# Patient Record
Sex: MALE | Race: WHITE
[De-identification: names, ages, dates, MRNs, and addresses within clinical notes are randomized per-mention and may not be internally consistent; named-entity substitution may affect disease eponyms.]

---

## 2017-09-09 ENCOUNTER — HOSPITAL ENCOUNTER (EMERGENCY)
Dept: HOSPITAL 1 - ED | Age: 22
Discharge: HOME | End: 2017-09-09
Payer: SELF-PAY

## 2017-09-09 VITALS — BODY MASS INDEX: 27.66 KG/M2 | WEIGHT: 162 LBS | HEIGHT: 64 IN

## 2017-09-09 VITALS — DIASTOLIC BLOOD PRESSURE: 45 MMHG | SYSTOLIC BLOOD PRESSURE: 113 MMHG

## 2017-09-09 DIAGNOSIS — T63.511A: Primary | ICD-10-CM
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
## 2017-10-30 ENCOUNTER — HOSPITAL ENCOUNTER (EMERGENCY)
Dept: HOSPITAL 1 - ED | Age: 22
Discharge: HOME | End: 2017-10-30
Payer: SELF-PAY

## 2017-10-30 VITALS — BODY MASS INDEX: 26.63 KG/M2 | HEIGHT: 64 IN | WEIGHT: 156 LBS

## 2017-10-30 VITALS — DIASTOLIC BLOOD PRESSURE: 88 MMHG | SYSTOLIC BLOOD PRESSURE: 144 MMHG

## 2017-10-30 DIAGNOSIS — M54.5: ICD-10-CM

## 2017-10-30 DIAGNOSIS — K70.9: ICD-10-CM

## 2017-10-30 DIAGNOSIS — R03.0: ICD-10-CM

## 2017-10-30 DIAGNOSIS — E86.0: ICD-10-CM

## 2017-10-30 DIAGNOSIS — Z71.6: ICD-10-CM

## 2017-10-30 DIAGNOSIS — F17.210: ICD-10-CM

## 2017-10-30 DIAGNOSIS — G44.209: Primary | ICD-10-CM

## 2017-10-30 LAB
ALBUMIN SERPL-MCNC: 5.6 G/DL (ref 3.4–5)
ALP SERPL-CCNC: 69 U/L (ref 46–116)
ALT SERPL-CCNC: 359 U/L (ref 16–63)
AST SERPL-CCNC: 170 U/L (ref 15–37)
BASOPHILS NFR BLD: 0.3 % (ref 0–2)
BILIRUB SERPL-MCNC: 0.94 MG/DL (ref 0.2–1)
BUN SERPL-MCNC: 12 MG/DL (ref 7–18)
CALCIUM SERPL-MCNC: 10.1 MG/DL (ref 8.5–10.1)
CHLORIDE SERPL-SCNC: 100 MMOL/L (ref 98–107)
CO2 SERPL-SCNC: 24.8 MMOL/L (ref 21–32)
CREAT SERPL-MCNC: 1 MG/DL (ref 0.7–1.3)
ERYTHROCYTE [DISTWIDTH] IN BLOOD BY AUTOMATED COUNT: 13.5 % (ref 11.5–14.5)
GFR SERPLBLD BASED ON 1.73 SQ M-ARVRAT: > 60 ML/MIN
GLUCOSE SERPL-MCNC: 79 MG/DL (ref 74–106)
LIPASE SERPL-CCNC: 193 IU/L (ref 73–393)
MICROSCOPIC UR-IMP: YES
PLATELET # BLD: 249 X10^3MCL (ref 130–400)
POTASSIUM SERPL-SCNC: 3.9 MMOL/L (ref 3.5–5.1)
PROT SERPL-MCNC: 9.9 G/DL (ref 6.4–8.2)
RBC # UR STRIP.AUTO: (no result) /UL
SODIUM SERPL-SCNC: 138 MMOL/L (ref 136–145)
UA SPECIFIC GRAVITY: >=1.03 (ref 1–1.03)

## 2018-04-29 ENCOUNTER — HOSPITAL ENCOUNTER (EMERGENCY)
Dept: HOSPITAL 1 - ED | Age: 23
Discharge: HOME | End: 2018-04-29
Payer: COMMERCIAL

## 2018-04-29 VITALS — HEIGHT: 64.02 IN | BODY MASS INDEX: 25.61 KG/M2 | WEIGHT: 149.98 LBS

## 2018-04-29 VITALS — DIASTOLIC BLOOD PRESSURE: 88 MMHG | SYSTOLIC BLOOD PRESSURE: 162 MMHG

## 2018-04-29 DIAGNOSIS — M54.9: Primary | ICD-10-CM

## 2018-04-29 DIAGNOSIS — F10.129: ICD-10-CM

## 2018-04-29 LAB
ALBUMIN SERPL-MCNC: 4.8 G/DL (ref 3.4–5)
ALP SERPL-CCNC: 61 U/L (ref 46–116)
ALT SERPL-CCNC: 95 U/L (ref 16–63)
AST SERPL-CCNC: 54 U/L (ref 15–37)
BASOPHILS NFR BLD: 0.8 % (ref 0–2)
BILIRUB SERPL-MCNC: 0.3 MG/DL (ref 0.2–1)
BUN SERPL-MCNC: 8 MG/DL (ref 7–18)
CALCIUM SERPL-MCNC: 8.4 MG/DL (ref 8.5–10.1)
CHLORIDE SERPL-SCNC: 106 MMOL/L (ref 98–107)
CO2 SERPL-SCNC: 28.8 MMOL/L (ref 21–32)
CREAT SERPL-MCNC: 0.8 MG/DL (ref 0.7–1.3)
ERYTHROCYTE [DISTWIDTH] IN BLOOD BY AUTOMATED COUNT: 13.3 % (ref 11.5–14.5)
GFR SERPLBLD BASED ON 1.73 SQ M-ARVRAT: > 60 ML/MIN
GLUCOSE SERPL-MCNC: 83 MG/DL (ref 74–106)
PLATELET # BLD: 336 X10^3MCL (ref 130–400)
POTASSIUM SERPL-SCNC: 3.6 MMOL/L (ref 3.5–5.1)
PROT SERPL-MCNC: 8.9 G/DL (ref 6.4–8.2)
SODIUM SERPL-SCNC: 145 MMOL/L (ref 136–145)

## 2018-06-14 ENCOUNTER — HOSPITAL ENCOUNTER (EMERGENCY)
Dept: HOSPITAL 1 - ED | Age: 23
Discharge: HOME | End: 2018-06-14
Payer: COMMERCIAL

## 2018-06-14 VITALS — DIASTOLIC BLOOD PRESSURE: 87 MMHG | SYSTOLIC BLOOD PRESSURE: 136 MMHG

## 2018-06-14 VITALS — BODY MASS INDEX: 24.99 KG/M2 | HEIGHT: 65 IN | WEIGHT: 149.98 LBS

## 2018-06-14 DIAGNOSIS — M94.0: Primary | ICD-10-CM

## 2018-06-14 DIAGNOSIS — F17.200: ICD-10-CM

## 2018-07-09 ENCOUNTER — HOSPITAL ENCOUNTER (EMERGENCY)
Dept: HOSPITAL 1 - ED | Age: 23
Discharge: HOME | End: 2018-07-09
Payer: COMMERCIAL

## 2018-07-09 VITALS — HEIGHT: 62.99 IN | WEIGHT: 146.98 LBS | BODY MASS INDEX: 26.04 KG/M2

## 2018-07-09 VITALS — SYSTOLIC BLOOD PRESSURE: 131 MMHG | DIASTOLIC BLOOD PRESSURE: 71 MMHG
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
## 2018-07-09 DIAGNOSIS — S63.612A: Primary | ICD-10-CM

## 2018-07-09 DIAGNOSIS — Z87.19: ICD-10-CM
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
## 2018-07-09 DIAGNOSIS — R11.10: ICD-10-CM
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
## 2018-07-09 DIAGNOSIS — S09.90XA: ICD-10-CM

## 2018-09-23 ENCOUNTER — HOSPITAL ENCOUNTER (EMERGENCY)
Dept: HOSPITAL 1 - ED | Age: 23
Discharge: HOME | End: 2018-09-23
Payer: COMMERCIAL

## 2018-09-23 VITALS — WEIGHT: 165 LBS | HEIGHT: 64 IN | BODY MASS INDEX: 28.17 KG/M2

## 2018-09-23 VITALS — DIASTOLIC BLOOD PRESSURE: 109 MMHG | SYSTOLIC BLOOD PRESSURE: 144 MMHG

## 2018-09-23 DIAGNOSIS — K64.8: Primary | ICD-10-CM

## 2019-01-02 ENCOUNTER — HOSPITAL ENCOUNTER (EMERGENCY)
Dept: HOSPITAL 1 - ED | Age: 24
Discharge: HOME | End: 2019-01-02
Payer: SELF-PAY

## 2019-01-02 VITALS
BODY MASS INDEX: 26.63 KG/M2 | HEIGHT: 64 IN | HEIGHT: 64 IN | WEIGHT: 156 LBS | WEIGHT: 156 LBS | BODY MASS INDEX: 26.63 KG/M2

## 2019-01-02 VITALS — SYSTOLIC BLOOD PRESSURE: 114 MMHG | DIASTOLIC BLOOD PRESSURE: 56 MMHG
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
## 2019-01-02 DIAGNOSIS — F17.210: ICD-10-CM
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
## 2019-01-02 DIAGNOSIS — F10.10: ICD-10-CM
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
## 2019-01-02 DIAGNOSIS — S09.8XXA: Primary | ICD-10-CM

## 2019-01-02 DIAGNOSIS — F10.239: ICD-10-CM

## 2019-08-10 ENCOUNTER — HOSPITAL ENCOUNTER (EMERGENCY)
Dept: HOSPITAL 1 - ED | Age: 24
LOS: 1 days | Discharge: HOME | End: 2019-08-11
Payer: COMMERCIAL

## 2019-08-10 VITALS
HEIGHT: 64 IN | WEIGHT: 160 LBS | WEIGHT: 160 LBS | BODY MASS INDEX: 27.31 KG/M2 | HEIGHT: 64 IN | BODY MASS INDEX: 27.31 KG/M2
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
## 2019-08-10 DIAGNOSIS — K29.21: ICD-10-CM

## 2019-08-10 DIAGNOSIS — F10.129: Primary | ICD-10-CM

## 2019-08-11 VITALS — DIASTOLIC BLOOD PRESSURE: 41 MMHG | SYSTOLIC BLOOD PRESSURE: 100 MMHG

## 2019-08-11 LAB
ALBUMIN SERPL-MCNC: 4.2 G/DL (ref 3.4–5)
ALP SERPL-CCNC: 71 U/L (ref 46–116)
ALT SERPL-CCNC: 69 U/L (ref 16–63)
AST SERPL-CCNC: 35 U/L (ref 15–37)
BASOPHILS NFR BLD: 0.5 % (ref 0–2)
BILIRUB SERPL-MCNC: 0.15 MG/DL (ref 0.2–1)
BUN SERPL-MCNC: 7 MG/DL (ref 7–18)
CALCIUM SERPL-MCNC: 8.4 MG/DL (ref 8.5–10.1)
CHLORIDE SERPL-SCNC: 105 MMOL/L (ref 98–107)
CO2 SERPL-SCNC: 23.5 MMOL/L (ref 21–32)
CREAT SERPL-MCNC: 0.8 MG/DL (ref 0.7–1.3)
ERYTHROCYTE [DISTWIDTH] IN BLOOD BY AUTOMATED COUNT: 11.9 % (ref 11.5–14.5)
GFR SERPLBLD BASED ON 1.73 SQ M-ARVRAT: > 60 ML/MIN
GLUCOSE SERPL-MCNC: 108 MG/DL (ref 74–106)
PLATELET # BLD: 304 X10^3MCL (ref 130–400)
POTASSIUM SERPL-SCNC: 4.5 MMOL/L (ref 3.5–5.1)
PROT SERPL-MCNC: 8.2 G/DL (ref 6.4–8.2)
SODIUM SERPL-SCNC: 141 MMOL/L (ref 136–145)

## 2019-10-16 ENCOUNTER — HOSPITAL ENCOUNTER (INPATIENT)
Dept: HOSPITAL 1 - ED | Age: 24
LOS: 3 days | Discharge: HOME | DRG: 241 | End: 2019-10-19
Attending: HOSPITALIST | Admitting: HOSPITALIST
Payer: COMMERCIAL

## 2019-10-16 VITALS
WEIGHT: 145 LBS | WEIGHT: 145 LBS | BODY MASS INDEX: 25.69 KG/M2 | HEIGHT: 63 IN | BODY MASS INDEX: 25.69 KG/M2 | HEIGHT: 63 IN

## 2019-10-16 VITALS — DIASTOLIC BLOOD PRESSURE: 80 MMHG | SYSTOLIC BLOOD PRESSURE: 126 MMHG

## 2019-10-16 DIAGNOSIS — E83.51: ICD-10-CM

## 2019-10-16 DIAGNOSIS — K29.71: Primary | ICD-10-CM

## 2019-10-16 DIAGNOSIS — R74.0: ICD-10-CM

## 2019-10-16 DIAGNOSIS — E72.20: ICD-10-CM

## 2019-10-16 DIAGNOSIS — E78.5: ICD-10-CM

## 2019-10-16 DIAGNOSIS — F17.210: ICD-10-CM

## 2019-10-16 DIAGNOSIS — F10.220: ICD-10-CM

## 2019-10-16 DIAGNOSIS — N39.0: ICD-10-CM

## 2019-10-16 DIAGNOSIS — K72.90: ICD-10-CM
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
## 2019-10-16 DIAGNOSIS — G92: ICD-10-CM

## 2019-10-16 DIAGNOSIS — K29.80: ICD-10-CM

## 2019-10-16 DIAGNOSIS — E83.41: ICD-10-CM

## 2019-10-16 LAB
ALBUMIN SERPL-MCNC: 4.3 G/DL (ref 3.4–5)
ALP SERPL-CCNC: 79 U/L (ref 46–116)
ALT SERPL-CCNC: 81 U/L (ref 16–63)
AMPHETAMINES UR QL SCN: (no result)
AST SERPL-CCNC: 80 U/L (ref 15–37)
BASOPHILS NFR BLD: 0.9 % (ref 0–2)
BILIRUB SERPL-MCNC: 0.2 MG/DL (ref 0.2–1)
BUN SERPL-MCNC: 9 MG/DL (ref 7–18)
CALCIUM SERPL-MCNC: 8.1 MG/DL (ref 8.5–10.1)
CHLORIDE SERPL-SCNC: 103 MMOL/L (ref 98–107)
CHOLEST SERPL-MCNC: 249 MG/DL (ref ?–200)
CO2 SERPL-SCNC: 26 MMOL/L (ref 21–32)
CREAT SERPL-MCNC: 0.8 MG/DL (ref 0.7–1.3)
ERYTHROCYTE [DISTWIDTH] IN BLOOD BY AUTOMATED COUNT: 13.5 % (ref 11.5–14.5)
GFR SERPLBLD BASED ON 1.73 SQ M-ARVRAT: > 60 ML/MIN
GLUCOSE SERPL-MCNC: 105 MG/DL (ref 74–106)
HDLC SERPL-MCNC: 50 MG/DL (ref 40–60)
LIPASE SERPL-CCNC: 270 IU/L (ref 73–393)
MAGNESIUM SERPL-MCNC: 2.5 MG/DL (ref 1.8–2.4)
MICROSCOPIC UR-IMP: YES
PLATELET # BLD: 323 X10^3MCL (ref 130–400)
POTASSIUM SERPL-SCNC: 4 MMOL/L (ref 3.5–5.1)
PROT SERPL-MCNC: 8.5 G/DL (ref 6.4–8.2)
RBC # UR STRIP.AUTO: (no result) /UL
SODIUM SERPL-SCNC: 142 MMOL/L (ref 136–145)
T4 SERPL-MCNC: 6.9 UG/DL (ref 4.7–13.3)
UA SPECIFIC GRAVITY: <=1.005 (ref 1–1.03)

## 2019-10-16 PROCEDURE — G0480 DRUG TEST DEF 1-7 CLASSES: HCPCS

## 2019-10-16 PROCEDURE — C9113 INJ PANTOPRAZOLE SODIUM, VIA: HCPCS

## 2019-10-16 PROCEDURE — G0378 HOSPITAL OBSERVATION PER HR: HCPCS

## 2019-10-16 NOTE — NUR
RECEIVED PT FROM ER, PT ADMIT FOR HEPATIC ENCEPHALOPATHY, ABD PAIN, PT IS A/O
X4, VERBAL RESPONSIVE, LUNG SOUND CLEAR BILATERAL, NO COUGH, NO SOB, PT IS ON
TELE 10, ST, DENY ANY CHEST PAIN OR DISCOMFORT, BOWEL SOUND PRESENT ALL 4
QUADRANTS, NO DISTENTION, C/O ABD PAIN AT MID RADATE TO BACK 10/10, C/O BLOODY
VOMITING AND DARK STOOL. PEDAL PULSE PRESENT BOTH FEET, NO EDEMA, IV AT RIGHT
AC, NO LEAKING, NO INFILRATION. ALL ADLS ASSIST, ALL NEED MET, CALL LIGHT
IN REACH, WILL CONTINUE TO MONITOR.

## 2019-10-16 NOTE — NUR
RECEIVED PATIENT FROM TRIAGE, PATIENT AMBULATED TO OB ROOM, C/O MID ABD PAIN X
2.5 WEEKS (ON AND OFF), "VOMITING BLOOD" X 4+WEEKS, STATES LAST ALCOHOL DRINK
WAS YESTERDAY. PATIENT IS AAO X 4 (NAME, DATE, CONDITION AND LOCATION). EYES -
SOBEIDA. MUCUS - PINK. LUNGS - CTA. BS - PRESENT X 4 QUADRANTS. B/L UPPER AND
LOWER EXT PULSES PALPABLE. WILL CONTINUE TO MONITOR//APR RN

## 2019-10-16 NOTE — NUR
STRATED ON IVF NS AT 100CC/HR INFUSING VIA PERIPHERAL LINE AT THE [facility],
LEVAQUIN ATB IVPB IN PROGRESS. PLACED CALL LITH WITHIN REACH. BED LOCKED AND
IN LOWEST POSITION. WILL CONTINUE TO MONITOR.

## 2019-10-16 NOTE — NUR
PATIENT SLEEPING, BUT EASILY AROUSABLE AT THIS TIME. AS PER DR HICKS ONLY 1
BANANA BAG NEEDED, HE'LL D/C THE 2ND BANANA BAG THAT HE ORDERED.//APR RN

## 2019-10-16 NOTE — NUR
PT. LAYING IN BED, AAOX4, NO ACUTE DISTRESS NOTED, STS HIS PAIN LEVEL 10/10,
MID ABDOMINAL REGION + NAUSEA, PT. STS. HE HAS HAD THIS PAIN FOR TOO MANY YEARS
NOW, DR. KEYS AWARE, VSS, WILL CONTINUE TO MONITOR.

## 2019-10-17 VITALS — SYSTOLIC BLOOD PRESSURE: 121 MMHG | DIASTOLIC BLOOD PRESSURE: 77 MMHG

## 2019-10-17 VITALS — SYSTOLIC BLOOD PRESSURE: 136 MMHG | DIASTOLIC BLOOD PRESSURE: 78 MMHG

## 2019-10-17 VITALS — DIASTOLIC BLOOD PRESSURE: 93 MMHG | SYSTOLIC BLOOD PRESSURE: 140 MMHG

## 2019-10-17 VITALS — SYSTOLIC BLOOD PRESSURE: 127 MMHG | DIASTOLIC BLOOD PRESSURE: 79 MMHG

## 2019-10-17 VITALS — DIASTOLIC BLOOD PRESSURE: 86 MMHG | SYSTOLIC BLOOD PRESSURE: 135 MMHG

## 2019-10-17 LAB
BASOPHILS NFR BLD: 0.4 % (ref 0–2)
BASOPHILS NFR BLD: 0.5 % (ref 0–2)
BUN SERPL-MCNC: 8 MG/DL (ref 7–18)
CALCIUM SERPL-MCNC: 7.6 MG/DL (ref 8.5–10.1)
CHLORIDE SERPL-SCNC: 104 MMOL/L (ref 98–107)
CO2 SERPL-SCNC: 22.8 MMOL/L (ref 21–32)
CREAT SERPL-MCNC: 0.8 MG/DL (ref 0.7–1.3)
ERYTHROCYTE [DISTWIDTH] IN BLOOD BY AUTOMATED COUNT: 13.2 % (ref 11.5–14.5)
ERYTHROCYTE [DISTWIDTH] IN BLOOD BY AUTOMATED COUNT: 13.4 % (ref 11.5–14.5)
GFR SERPLBLD BASED ON 1.73 SQ M-ARVRAT: > 60 ML/MIN
GLUCOSE SERPL-MCNC: 80 MG/DL (ref 74–106)
MAGNESIUM SERPL-MCNC: 2 MG/DL (ref 1.8–2.4)
PHOSPHATE SERPL-MCNC: 2.6 MG/DL (ref 2.5–4.9)
PLATELET # BLD: 230 X10^3MCL (ref 130–400)
PLATELET # BLD: 239 X10^3MCL (ref 130–400)
POTASSIUM SERPL-SCNC: 4 MMOL/L (ref 3.5–5.1)
SODIUM SERPL-SCNC: 139 MMOL/L (ref 136–145)

## 2019-10-17 NOTE — NUR
QUIE IN BED. DENIES ANY PAIN/DISCOMFORT. ALL NEEDS ATTENDED. NO ADVERSE
REACTION NOTED FROM ATB THERAPY.

## 2019-10-17 NOTE — NUR
RECEIVED AWAKE WATCHING TV. SKIN WARM AND DRY TO TOUCH. RESPIRATION EVEN AND
UNLABORED. DENIES ANY PAIN/DISCOMFORT AT THIS TIME. IV ACCESS AT THE LRFT HAND
INTACT AND PATENT. NO SWELLING /NO REDNESS NOTED AT THE IV SITE. CALL LIGHT
WITHIN REACH . INSTRUCTED TO CALL FOR ANY ASSISTANCE AS NEEDED ,

## 2019-10-17 NOTE — NUR
PT STABLE AT THIS TIME RESTIN G IN BED COMFORTABLY WITH MOTHER AT BEDSIDE. ALL
CARES TOLERATED WELL. VS WNL, NO DISTRESS NOTED. IV TO LEFT HAND INTACT AND
PATENT WITH NO REDNESS OR INFLAMMATION NOTED, NS RUNNING AT 125ML/HR. A/O X4
NO HA OR DIZZINESS. TELE #10 CONNECTED TO PT, DENIES CP OR PRESSURE. NO N/V AT
THIS TIME. SAFETY PRECAUTIONS IN PLACE, CALL LIGHT WITHIN REACH, WILL ENDORSE
TO NIGHT NURSE.

## 2019-10-17 NOTE — NUR
EYES CLOSED, APARENTLY ASLEEP. NO FACIAL GRIMACING NOTED. RESPIRATION EVEN AND
UNLABORED. NO S/S OF ACUTE DISTRESS.

## 2019-10-17 NOTE — NUR
RECIEVED PT RESING IN BED WITH NO C/O OF ANY DISTRESS. A/O X4 WITH NO HEA OR
DIZZINESS. DENIES ANY N/V AT THIS TIME. TELE #10 CONNECTED TO PT, DENIES CP OR
PRESSURE. NS 125ML/HR RUNNING IN RAC, INTACT AND PATENT WITH NO REDNESS OR
INFLAMMATION NOTED. SAFETY PRECUTIONS IN PLACE, CALL LIGHT WITHIN REACH, WILL
MONITOR.

## 2019-10-17 NOTE — NUR
OLD IV REMOVED FROM RAC WITH CATHETER INTACT, NEW IV STARTED IN LEFT HAND WITH
22 GUAGE, INTACT AND PATENT WITH NO REDNESS OR INFLAMMATION NOTED.

## 2019-10-17 NOTE — NUR
BLADDER SKAN DONE PER MD ORDER, 240 ML URINE FOUND IN BLADDER, NO NEED FOR
STRAIGHT CATH PER DR KHOURY.

## 2019-10-17 NOTE — NUR
ZOFRAN GIVEN PER EMAR FOR VOMITING. VOMIT HAS TRACE BLOOD IN IT, DR KHOURY NOTIFIED AND ALSO MADE AWARE OF AMMONIA LEVEL OF 52. WILL FOLLOW
THROUGH WITH ANY NEW ORDERS.

## 2019-10-18 VITALS — DIASTOLIC BLOOD PRESSURE: 62 MMHG | SYSTOLIC BLOOD PRESSURE: 109 MMHG

## 2019-10-18 VITALS — DIASTOLIC BLOOD PRESSURE: 72 MMHG | SYSTOLIC BLOOD PRESSURE: 121 MMHG

## 2019-10-18 VITALS — DIASTOLIC BLOOD PRESSURE: 89 MMHG | SYSTOLIC BLOOD PRESSURE: 129 MMHG

## 2019-10-18 VITALS — SYSTOLIC BLOOD PRESSURE: 140 MMHG | DIASTOLIC BLOOD PRESSURE: 96 MMHG

## 2019-10-18 VITALS — DIASTOLIC BLOOD PRESSURE: 71 MMHG | SYSTOLIC BLOOD PRESSURE: 119 MMHG

## 2019-10-18 LAB
BASOPHILS NFR BLD: 0.4 % (ref 0–2)
BUN SERPL-MCNC: 7 MG/DL (ref 7–18)
CALCIUM SERPL-MCNC: 8.8 MG/DL (ref 8.5–10.1)
CHLORIDE SERPL-SCNC: 100 MMOL/L (ref 98–107)
CO2 SERPL-SCNC: 28.5 MMOL/L (ref 21–32)
CREAT SERPL-MCNC: 0.8 MG/DL (ref 0.7–1.3)
ERYTHROCYTE [DISTWIDTH] IN BLOOD BY AUTOMATED COUNT: 13.1 % (ref 11.5–14.5)
GFR SERPLBLD BASED ON 1.73 SQ M-ARVRAT: > 60 ML/MIN
GLUCOSE SERPL-MCNC: 83 MG/DL (ref 74–106)
MAGNESIUM SERPL-MCNC: 2 MG/DL (ref 1.8–2.4)
PHOSPHATE SERPL-MCNC: 3 MG/DL (ref 2.5–4.9)
PLATELET # BLD: 232 X10^3MCL (ref 130–400)
POTASSIUM SERPL-SCNC: 4.5 MMOL/L (ref 3.5–5.1)
SODIUM SERPL-SCNC: 135 MMOL/L (ref 136–145)

## 2019-10-18 PROCEDURE — 0DB68ZX EXCISION OF STOMACH, VIA NATURAL OR ARTIFICIAL OPENING ENDOSCOPIC, DIAGNOSTIC: ICD-10-PCS | Performed by: INTERNAL MEDICINE

## 2019-10-18 NOTE — NUR
MAINTAINED ON NPO AFTER MIDNIGHT FOR EGD IN AM. LACTULOSE GIVEN AS ORDERED,
TOLERATED WELL. ABLE TO REPOSITION SELF IN BED.

## 2019-10-18 NOTE — NUR
RECEIVED PT BACK FROM GI, PT IS DROWSY BUT EASILY AROUSABLE TO VERBAL STIMULI.
NO C/O PAIN AND SOB. VITAL SIGNS ARE AS FOLLOWS: NH=766/72, RR=16, TEMP=97.1,
WY=69, AND O2 SAT=98%, RA. PT DENIES SWALLOWING DIFFICULTIES. W/ IV ON THE
LEFT HAND GAUGE 22, PATENT AND INTACT. CALL LIGHT ON REACH. HOB ELEVATED AT 45
DEG. WILL CONT TO MONITOR

## 2019-10-18 NOTE — NUR
RECEIVED PT IN BED, A/O X4. ABLE TO VERBALIZE NEEDS. DENIES
HEADACHE/DIZZINESS. RESP. EVEN AND UNLABORED. ON ROOM AIR, NO ACUTE
DISTRESS NOTED. AFEBRILE AND VITAL SIGNS STABLE. SR ON THE  MONITOR, DENIES CP
OR ANY DISCOMFORT AT THIS TIME. ABD. SOFT, NON DISTENDED , BS ACTIVE, NO N/V
NOTED. IVF, NS AT 125ML/HR, INTACT AND INFUSING VIA LT HAND, SITE CLEAR.
AMBULATORY. VOIDING FREELY. CALL LIGHT WITHIN REAACH. WILL CONTINUE TO
MONITOR.

## 2019-10-18 NOTE — NUR
RECEIVED PT FROM NIGHT SHIFT NURSE. PT RESTING IN BED, AOX4, RESP E/U ON RA.
C/O HEADACHE AND ABD PAIN RATED 8/10, DENIES N/V. WILL CARRY OUT PAIN MED
ORDERS PER EMAR, COMFORT MEASURES IMPLEMENTED. ON TELE 10 SHOWING NSR, HR: 63.
IV TO LFA W/ NO SIGNS OF INFILTRATION, IVF INFUSING WELL. BED IN LOWEST
POSITION AND CALL LIGHT WITHIN REACH. WILL CONTINUE TO MONITOR.

## 2019-10-18 NOTE — NUR
CHG BATH GIVEN TOELRATED WELL.CONTINUES WITH IVF NS AT 125ML/HR VIA PERIPHERAL
LINE AT THE [facility]. FOR EGD TODAY, MAINTAINED ON NPO, PT
COOPERATIVE WITH PLAN OF CARE. ALL NEEDS ATTENDED.

## 2019-10-18 NOTE — NUR
Initial Nutrition Assessment: 253T/B FATIMAH BLACKWOOD IA HR
 
 Dx: Hepatic encephalopathy, abd pain
 PMHx: none
 PSHx: none
Labs: NA 135L, AST 80H, ALT 81H, CHOL 249H, Ammonia 52H, WBC 3.7L
 Meds: Ativan, cephulac, Colace, folic acid, Levaquin, theragran, vitamin B-1,
zofran
Diet: NPO (procedure)
PO intake since admission: NPO
 Ht: 160.02 cm (63")               Wt: 65.7 kg (145#)       BMI: 25.7 kg/m2
  Bed scale: 65.7 kg
IBW: 124# (56 kg) %IBW: 116 UBW: 145#
 Age: 24/M
 Food Allergies: NKFA
 Skin: intact Stalin: 23
 Edema: none
 GI: dark stool w/ bloody emesis on 10/17   Last BM: 10/15
 Trigger: N/V/D >3d, poor PO >3d
Per H&P, Pt is a 24 years old male with no significant PMH who brought to ED
due to acute epigastric pain.
 
RD Note (10/18): Patient was alert and oriented and said that he had some
nausea, vomiting a couple of hours ago and is better now. Patient was NPO for
EGD and diet has been progressed to regular diet.
 
 
 Problem with:  N/V/D/C: none at this time
 Problems with: Chewing:  Swallowing:  none
 Current appetite: good
Recent wt change: none  %wt change: n/a
Vitamin/Supplement use: none at home
Special diet at home: Regular
Physical activity: walking
Nutrition education given: none at this time
 Food-drug interactions: none Education given: n/a
 
 Estimated Nutritional Needs Based on current body weight (65.7 kg)
 Energy:1589-5161 kcal/day (25-35 kcal/kg for hepatic encephalopathy)
 Protein: 53-65 g/day (0.8-1.0 g/kg for hepatic encephalopathy)
 Fluid: 2568-5158 mL/day (1 mL/kcal)
 
Nutrition Diagnosis:
1. Impaired nutrient utilization related to hepatic encephalopathy as
evidenced by ammonia 52.
Intervention
1. Recommend low fat, low sodium diet.
Paged NP Fabi, waiting for call back.
 
Monitor/Evaluate
 Goal: PO intake at least 75% of estimated needs
 Monitor: PO intake, Labs, GI function
 F/U in 3-5 days as moderate risk 10/21-23

## 2019-10-18 NOTE — NUR
PT RESTING IN BED, AOX4, RESP E/U ON RA. DENIES HEADACHE, ABD/BACK PAIN OR N/V
AT THIS TIME, NO ACUTE DISTRESS NOTED. BED IN LOWEST POSITION AND CALL LIGHT
WITHIN REACH. FAMILY AT BEDSIDE. WILL CONTINUE TO MONITOR.

## 2019-10-18 NOTE — NUR
PT MEDICATED AS ORDERED PER EMAR FOR HEADACHE/ABD PAIN RATED 9/10. IV TO LEFT
HAND PATENT AND INTACT. BED IN LOWEST POSITIONA AND CALL LIGHT WITHIN REACH.
FAMILY AT BEDSIDE. WILL ENDORSE TO ONCOMING NURSE.

## 2019-10-19 VITALS — DIASTOLIC BLOOD PRESSURE: 65 MMHG | SYSTOLIC BLOOD PRESSURE: 113 MMHG

## 2019-10-19 VITALS — DIASTOLIC BLOOD PRESSURE: 67 MMHG | SYSTOLIC BLOOD PRESSURE: 114 MMHG

## 2019-10-19 VITALS — DIASTOLIC BLOOD PRESSURE: 81 MMHG | SYSTOLIC BLOOD PRESSURE: 135 MMHG

## 2019-10-19 VITALS — DIASTOLIC BLOOD PRESSURE: 85 MMHG | SYSTOLIC BLOOD PRESSURE: 135 MMHG

## 2019-10-19 LAB
BASOPHILS NFR BLD: 0.3 % (ref 0–2)
BUN SERPL-MCNC: 10 MG/DL (ref 7–18)
CALCIUM SERPL-MCNC: 8.3 MG/DL (ref 8.5–10.1)
CHLORIDE SERPL-SCNC: 104 MMOL/L (ref 98–107)
CO2 SERPL-SCNC: 27.6 MMOL/L (ref 21–32)
CREAT SERPL-MCNC: 1 MG/DL (ref 0.7–1.3)
ERYTHROCYTE [DISTWIDTH] IN BLOOD BY AUTOMATED COUNT: 13.1 % (ref 11.5–14.5)
GFR SERPLBLD BASED ON 1.73 SQ M-ARVRAT: > 60 ML/MIN
GLUCOSE SERPL-MCNC: 94 MG/DL (ref 74–106)
PLATELET # BLD: 229 X10^3MCL (ref 130–400)
POTASSIUM SERPL-SCNC: 4 MMOL/L (ref 3.5–5.1)
SODIUM SERPL-SCNC: 139 MMOL/L (ref 136–145)

## 2019-10-19 NOTE — NUR
RESTING QUIETLY IN BED, WITH EYES CLOSED, APPEARS ASLEEP,  EASILY AROUSABLE.
RESP. EVEN AND UNLABORED. NO ACUTE DISTRESS NOTED.CALL LIGHT WITHIN REACH.
WILL CONTINUE TO MONITOR.

## 2019-10-19 NOTE — NUR
PT DC;D HOME IN NO DISTRESS, AWAKE, ALERT AND ORIENTED. VS STABLE. NO C/O PAIN
OR DISCOMFORT. NO ACTIVE GI BLEED. DC INSTRUCTIONS REVIEWED WITH PT AND
FAMILY. RX TO BE PICKED UP IN PHARMACY, PT AWARE. HL REMOVED AND SITE CLEAR.
PERSONAL BELONGINGS TAKEN HOME.

## 2019-10-19 NOTE — NUR
RECEIVED AWAKE, ALERT AND ORIENTED. NO RESP. DISTRESS NOTED. NO C/O PAIN OR
DISCOMFORT. VS WNL. IVF INFUSING WELL AND SITE CLEAR. CALL LIGHT WITHIN REACH.
WILL CONTINUE WITH PLAN OF CARE.

## 2019-10-19 NOTE — NUR
SLEPT WELL. NO COMPLAINTS NOTED. AFEBRILE AND VITAL SIGNS STABLE.DENIES PAIN
OR ANY DISCOMFORT. RESP. EVEN AND UNLABORED. NO ACUTE DISTRESS NOTED. DUE MEDS
GIVEN AS ORDERED, KRISS. WELL. IVF INTACT AND INFUSING WELL, SITE CLEAR. KEPT
COMFORTABLE. CALL LIGHT WITHIN REACH. WILL CONTINUE TO MONITOR.

## 2019-10-25 ENCOUNTER — HOSPITAL ENCOUNTER (EMERGENCY)
Dept: HOSPITAL 1 - ED | Age: 24
Discharge: HOME | End: 2019-10-25
Payer: COMMERCIAL

## 2019-10-25 VITALS — WEIGHT: 154.25 LBS | HEIGHT: 63 IN | BODY MASS INDEX: 27.33 KG/M2

## 2019-10-25 VITALS — SYSTOLIC BLOOD PRESSURE: 130 MMHG | DIASTOLIC BLOOD PRESSURE: 75 MMHG

## 2019-10-25 DIAGNOSIS — R10.13: Primary | ICD-10-CM

## 2019-10-25 LAB
ALBUMIN SERPL-MCNC: 4.2 G/DL (ref 3.4–5)
ALBUMIN SERPL-MCNC: 4.3 G/DL (ref 3.4–5)
ALP SERPL-CCNC: 58 U/L (ref 46–116)
ALP SERPL-CCNC: 60 U/L (ref 46–116)
ALT SERPL-CCNC: 105 U/L (ref 16–63)
ALT SERPL-CCNC: 108 U/L (ref 16–63)
AST SERPL-CCNC: 47 U/L (ref 15–37)
AST SERPL-CCNC: 49 U/L (ref 15–37)
BASOPHILS NFR BLD: 0.8 % (ref 0–2)
BILIRUB DIRECT SERPL-MCNC: 0.19 MG/DL (ref 0–0.2)
BILIRUB SERPL-MCNC: 0.8 MG/DL (ref 0.2–1)
BILIRUB SERPL-MCNC: 0.9 MG/DL (ref 0.2–1)
BUN SERPL-MCNC: 10 MG/DL (ref 7–18)
CALCIUM SERPL-MCNC: 8.9 MG/DL (ref 8.5–10.1)
CHLORIDE SERPL-SCNC: 103 MMOL/L (ref 98–107)
CO2 SERPL-SCNC: 29.8 MMOL/L (ref 21–32)
CREAT SERPL-MCNC: 0.9 MG/DL (ref 0.7–1.3)
ERYTHROCYTE [DISTWIDTH] IN BLOOD BY AUTOMATED COUNT: 13 % (ref 11.5–14.5)
GFR SERPLBLD BASED ON 1.73 SQ M-ARVRAT: > 60 ML/MIN
GLUCOSE SERPL-MCNC: 90 MG/DL (ref 74–106)
LIPASE SERPL-CCNC: 218 IU/L (ref 73–393)
MICROSCOPIC UR-IMP: YES
PLATELET # BLD: 217 X10^3MCL (ref 130–400)
POTASSIUM SERPL-SCNC: 4.3 MMOL/L (ref 3.5–5.1)
PROT SERPL-MCNC: 7.7 G/DL (ref 6.4–8.2)
PROT SERPL-MCNC: 8 G/DL (ref 6.4–8.2)
RBC # UR STRIP.AUTO: (no result) /UL
SODIUM SERPL-SCNC: 139 MMOL/L (ref 136–145)
UA SPECIFIC GRAVITY: 1.01 (ref 1–1.03)

## 2019-10-28 ENCOUNTER — HOSPITAL ENCOUNTER (EMERGENCY)
Dept: HOSPITAL 1 - ED | Age: 24
Discharge: HOME | End: 2019-10-28
Payer: COMMERCIAL

## 2019-10-28 VITALS
HEIGHT: 63 IN | WEIGHT: 155 LBS | BODY MASS INDEX: 27.46 KG/M2 | WEIGHT: 155 LBS | BODY MASS INDEX: 27.46 KG/M2 | HEIGHT: 63 IN

## 2019-10-28 VITALS — SYSTOLIC BLOOD PRESSURE: 120 MMHG | DIASTOLIC BLOOD PRESSURE: 60 MMHG

## 2019-10-28 DIAGNOSIS — F10.129: ICD-10-CM

## 2019-10-28 DIAGNOSIS — K29.70: Primary | ICD-10-CM

## 2019-10-28 LAB
ALBUMIN SERPL-MCNC: 4.4 G/DL (ref 3.4–5)
ALP SERPL-CCNC: 76 U/L (ref 46–116)
ALT SERPL-CCNC: 93 U/L (ref 16–63)
AST SERPL-CCNC: 50 U/L (ref 15–37)
BASOPHILS NFR BLD: 0.5 % (ref 0–2)
BILIRUB SERPL-MCNC: 0.2 MG/DL (ref 0.2–1)
BUN SERPL-MCNC: 7 MG/DL (ref 7–18)
CALCIUM SERPL-MCNC: 8.1 MG/DL (ref 8.5–10.1)
CHLORIDE SERPL-SCNC: 103 MMOL/L (ref 98–107)
CO2 SERPL-SCNC: 23.4 MMOL/L (ref 21–32)
CREAT SERPL-MCNC: 0.8 MG/DL (ref 0.7–1.3)
ERYTHROCYTE [DISTWIDTH] IN BLOOD BY AUTOMATED COUNT: 13.6 % (ref 11.5–14.5)
GFR SERPLBLD BASED ON 1.73 SQ M-ARVRAT: > 60 ML/MIN
GLUCOSE SERPL-MCNC: 104 MG/DL (ref 74–106)
LIPASE SERPL-CCNC: 329 IU/L (ref 73–393)
PLATELET # BLD: 265 X10^3MCL (ref 130–400)
POTASSIUM SERPL-SCNC: 3.7 MMOL/L (ref 3.5–5.1)
PROT SERPL-MCNC: 8 G/DL (ref 6.4–8.2)
SODIUM SERPL-SCNC: 138 MMOL/L (ref 136–145)

## 2019-10-28 PROCEDURE — G0480 DRUG TEST DEF 1-7 CLASSES: HCPCS

## 2019-11-09 ENCOUNTER — HOSPITAL ENCOUNTER (EMERGENCY)
Dept: HOSPITAL 1 - ED | Age: 24
Discharge: HOME | End: 2019-11-09
Payer: COMMERCIAL

## 2019-11-09 VITALS — DIASTOLIC BLOOD PRESSURE: 88 MMHG | SYSTOLIC BLOOD PRESSURE: 132 MMHG

## 2019-11-09 VITALS — HEIGHT: 62 IN | BODY MASS INDEX: 29.1 KG/M2 | WEIGHT: 158.13 LBS

## 2019-11-09 DIAGNOSIS — R07.81: Primary | ICD-10-CM

## 2020-02-10 ENCOUNTER — HOSPITAL ENCOUNTER (EMERGENCY)
Dept: HOSPITAL 1 - ED | Age: 25
Discharge: HOME | End: 2020-02-10
Payer: COMMERCIAL

## 2020-02-10 VITALS — SYSTOLIC BLOOD PRESSURE: 126 MMHG | DIASTOLIC BLOOD PRESSURE: 90 MMHG

## 2020-02-10 VITALS
WEIGHT: 152 LBS | HEIGHT: 62 IN | BODY MASS INDEX: 27.97 KG/M2 | WEIGHT: 152 LBS | BODY MASS INDEX: 27.97 KG/M2 | HEIGHT: 62 IN
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
## 2020-02-10 DIAGNOSIS — S93.402A: Primary | ICD-10-CM
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
## 2020-10-01 ENCOUNTER — HOSPITAL ENCOUNTER (EMERGENCY)
Dept: HOSPITAL 1 - ED | Age: 25
Discharge: HOME | End: 2020-10-01
Payer: COMMERCIAL

## 2020-10-01 VITALS — WEIGHT: 175 LBS | BODY MASS INDEX: 28.12 KG/M2 | HEIGHT: 66 IN

## 2020-10-01 VITALS — DIASTOLIC BLOOD PRESSURE: 83 MMHG | SYSTOLIC BLOOD PRESSURE: 131 MMHG
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
## 2020-10-01 DIAGNOSIS — S62.336A: Primary | ICD-10-CM
